# Patient Record
Sex: MALE | Race: WHITE | NOT HISPANIC OR LATINO
[De-identification: names, ages, dates, MRNs, and addresses within clinical notes are randomized per-mention and may not be internally consistent; named-entity substitution may affect disease eponyms.]

---

## 2022-03-04 PROBLEM — Z00.00 ENCOUNTER FOR PREVENTIVE HEALTH EXAMINATION: Status: ACTIVE | Noted: 2022-03-04

## 2022-06-15 ENCOUNTER — NON-APPOINTMENT (OUTPATIENT)
Age: 27
End: 2022-06-15

## 2022-06-17 ENCOUNTER — APPOINTMENT (OUTPATIENT)
Dept: UROLOGY | Facility: CLINIC | Age: 27
End: 2022-06-17
Payer: COMMERCIAL

## 2022-06-17 ENCOUNTER — TRANSCRIPTION ENCOUNTER (OUTPATIENT)
Age: 27
End: 2022-06-17

## 2022-06-17 VITALS
RESPIRATION RATE: 16 BRPM | DIASTOLIC BLOOD PRESSURE: 115 MMHG | TEMPERATURE: 98.1 F | BODY MASS INDEX: 24.5 KG/M2 | WEIGHT: 175 LBS | SYSTOLIC BLOOD PRESSURE: 153 MMHG | HEART RATE: 97 BPM | HEIGHT: 71 IN | OXYGEN SATURATION: 96 %

## 2022-06-17 DIAGNOSIS — N48.89 OTHER SPECIFIED DISORDERS OF PENIS: ICD-10-CM

## 2022-06-17 DIAGNOSIS — Z80.0 FAMILY HISTORY OF MALIGNANT NEOPLASM OF DIGESTIVE ORGANS: ICD-10-CM

## 2022-06-17 DIAGNOSIS — Z87.11 PERSONAL HISTORY OF PEPTIC ULCER DISEASE: ICD-10-CM

## 2022-06-17 DIAGNOSIS — N48.6 INDURATION PENIS PLASTICA: ICD-10-CM

## 2022-06-17 DIAGNOSIS — G90.9 DISORDER OF THE AUTONOMIC NERVOUS SYSTEM, UNSPECIFIED: ICD-10-CM

## 2022-06-17 DIAGNOSIS — Z78.9 OTHER SPECIFIED HEALTH STATUS: ICD-10-CM

## 2022-06-17 PROCEDURE — 99204 OFFICE O/P NEW MOD 45 MIN: CPT

## 2022-06-17 NOTE — REVIEW OF SYSTEMS
[Recent Weight Loss (___ Lbs)] : recent [unfilled] ~Ulb weight loss [Eyesight Problems] : eyesight problems [Dry Eyes] : dryness of the eyes [Earache] : earache [Sore Throat] : sore throat [Chest Pain] : chest pain [Palpitations] : palpitations [Cough] : cough [Abdominal Pain] : abdominal pain [Vomiting] : vomiting [Heartburn] : heartburn [Wake up at night to urinate  How many times?  ___] : wakes up to urinate [unfilled] times during the night [Strong urge to urinate] : strong urge to urinate [Bladder pressure] : experiences bladder pressure [Strain or push to urinate] : strain or push to urinate [Dizziness] : dizziness [Anxiety] : anxiety [Depression] : depression [Hot Flashes] : hot flashes [Muscle Weakness] : muscle weakness [Feelings Of Weakness] : feelings of weakness [Negative] : Heme/Lymph [FreeTextEntry2] : frequent urination (10-11 times per day)

## 2022-06-17 NOTE — ASSESSMENT
[FreeTextEntry1] : Mr Andrew is a 26 y.o. M who presents with penile concerns: mild penile pain, ?palpable firmness on penile shaft, slight penile curvature.  Discussed that symptoms could represent Peyronie's disease.  I am unable to feel a discrete plaque, however the right-sided shaft does feel little bit more firm compared to the left.  We discussed that he is likely in the active phase of Peyronie's disease given that he is having pain.  We discussed that based on the AUA guidelines, NSAIDs are the mainstay of treatment for pain in the active phase.  We discussed that oral agents have been shown to have limited use in this setting.  I discussed that we can reconvene once his disease has settled. 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  We also discussed ruling out STDs, which he is agreeable to\par - Likely active Peyronie's disease, NSAIDs as needed for pain control\par - UA, UCx, G/C\par -Offered follow-up appointment.  Given multiple upcoming changes to his life, he prefers to call back in several months once he is ready\par

## 2022-06-17 NOTE — PHYSICAL EXAM
[Normal Appearance] : normal appearance [Well Groomed] : well groomed [Edema] : no peripheral edema [Exaggerated Use Of Accessory Muscles For Inspiration] : no accessory muscle use [Abdomen Tenderness] : non-tender [Costovertebral Angle Tenderness] : no ~M costovertebral angle tenderness [Urethral Meatus] : meatus normal [Penis Abnormality] : normal circumcised penis [Epididymis] : the epididymides were normal [Testes Tenderness] : no tenderness of the testes [Testes Mass (___cm)] : there were no testicular masses [Normal Station and Gait] : the gait and station were normal for the patient's age [] : no rash [Sensation] : the sensory exam was normal to light touch and pinprick [Oriented To Time, Place, And Person] : oriented to person, place, and time [No Palpable Adenopathy] : no palpable adenopathy [FreeTextEntry1] : ?mild firmness on right penile shaft near the base. No discrete plaque. Mild soft tissue swelling / skin redundancy of the ventral shaft skin near the glans. No erythema, warmth, pain. Non pitting.

## 2022-06-17 NOTE — HISTORY OF PRESENT ILLNESS
[FreeTextEntry1] : CHRISTIANO HATCH is a 26 year M with a history of peptic ulcer disease, dysautonomia, and depression who presents today as a new patient evaluation for penile concerns.\par \par About 3 weeks ago, he noticed a hard lesion on the right side of his penile shaft.  This is not changing.  This is occasionally painful.  He is also noticed some swelling on the ventral aspect of his penile shaft near the tip.  This is not tender, and goes away with an erection.  No lesion at this area.  3 weeks ago when this started, there was no inciting event.  He had no trauma, no sexual intercourse, no new underwear, soaps, etc.  He does masturbate, but no changes to his masturbation routine.  No associated lower urinary tract symptoms, and denies frequency, burning, dysuria, hematuria.  She does have occasional urge urinary urgency which has been stable for years.  Slight curvature to the right, which he believes is new.  He is not sexually active, no partners in the past 6 months.\par \par Just graduated law school.  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

## 2022-06-18 LAB
APPEARANCE: CLEAR
BACTERIA: NEGATIVE
BILIRUBIN URINE: NEGATIVE
BLOOD URINE: NEGATIVE
C TRACH RRNA SPEC QL NAA+PROBE: NOT DETECTED
COLOR: YELLOW
GLUCOSE QUALITATIVE U: NEGATIVE
HYALINE CASTS: 0 /LPF
KETONES URINE: NEGATIVE
LEUKOCYTE ESTERASE URINE: NEGATIVE
MICROSCOPIC-UA: NORMAL
N GONORRHOEA RRNA SPEC QL NAA+PROBE: NOT DETECTED
NITRITE URINE: NEGATIVE
PH URINE: 7
PROTEIN URINE: NEGATIVE
RED BLOOD CELLS URINE: 0 /HPF
SOURCE AMPLIFICATION: NORMAL
SPECIFIC GRAVITY URINE: 1.01
SQUAMOUS EPITHELIAL CELLS: 0 /HPF
UROBILINOGEN URINE: NORMAL
WHITE BLOOD CELLS URINE: 0 /HPF

## 2022-06-20 LAB — BACTERIA UR CULT: NORMAL
